# Patient Record
Sex: FEMALE | Race: BLACK OR AFRICAN AMERICAN | NOT HISPANIC OR LATINO | Employment: OTHER | URBAN - METROPOLITAN AREA
[De-identification: names, ages, dates, MRNs, and addresses within clinical notes are randomized per-mention and may not be internally consistent; named-entity substitution may affect disease eponyms.]

---

## 2023-04-07 ENCOUNTER — OFFICE VISIT (OUTPATIENT)
Dept: PRIMARY CARE | Facility: CLINIC | Age: 53
End: 2023-04-07
Payer: COMMERCIAL

## 2023-04-07 VITALS
TEMPERATURE: 97.6 F | DIASTOLIC BLOOD PRESSURE: 85 MMHG | HEIGHT: 63 IN | BODY MASS INDEX: 26.75 KG/M2 | WEIGHT: 151 LBS | OXYGEN SATURATION: 99 % | HEART RATE: 65 BPM | SYSTOLIC BLOOD PRESSURE: 145 MMHG

## 2023-04-07 DIAGNOSIS — R23.2 HOT FLASHES: Primary | ICD-10-CM

## 2023-04-07 DIAGNOSIS — G56.03 CARPAL TUNNEL SYNDROME ON BOTH SIDES: ICD-10-CM

## 2023-04-07 DIAGNOSIS — M17.0 PRIMARY OSTEOARTHRITIS OF BOTH KNEES: ICD-10-CM

## 2023-04-07 PROCEDURE — 99214 OFFICE O/P EST MOD 30 MIN: CPT | Performed by: STUDENT IN AN ORGANIZED HEALTH CARE EDUCATION/TRAINING PROGRAM

## 2023-04-07 RX ORDER — ACETAMINOPHEN 650 MG/1
TABLET, FILM COATED, EXTENDED RELEASE ORAL
COMMUNITY
End: 2023-04-07

## 2023-04-07 RX ORDER — NAPROXEN 500 MG/1
TABLET ORAL 2 TIMES DAILY
COMMUNITY
Start: 2020-02-11 | End: 2023-04-07

## 2023-04-07 RX ORDER — ACETAMINOPHEN 500 MG
TABLET ORAL
COMMUNITY
Start: 2022-12-07

## 2023-04-07 RX ORDER — DICLOFENAC SODIUM 10 MG/G
GEL TOPICAL
COMMUNITY
Start: 2023-03-22 | End: 2023-10-05 | Stop reason: SDUPTHER

## 2023-04-07 RX ORDER — ACETAMINOPHEN 325 MG/1
TABLET ORAL
COMMUNITY
Start: 2023-03-22 | End: 2023-04-07

## 2023-04-07 RX ORDER — ACETAMINOPHEN 650 MG/1
TABLET, FILM COATED, EXTENDED RELEASE ORAL
Qty: 90 TABLET | Refills: 1 | Status: SHIPPED | OUTPATIENT
Start: 2023-04-07

## 2023-04-07 RX ORDER — LAMOTRIGINE 100 MG/1
TABLET ORAL
COMMUNITY
Start: 2023-04-05 | End: 2023-10-02 | Stop reason: SDUPTHER

## 2023-04-07 RX ORDER — NAPROXEN 500 MG/1
500 TABLET ORAL 2 TIMES DAILY
COMMUNITY
Start: 2008-12-05 | End: 2023-04-07

## 2023-04-07 RX ORDER — LIDOCAINE 560 MG/1
PATCH PERCUTANEOUS; TOPICAL; TRANSDERMAL
COMMUNITY
Start: 2013-07-15

## 2023-04-07 ASSESSMENT — PAIN SCALES - GENERAL: PAINLEVEL: 8

## 2023-04-07 NOTE — PROGRESS NOTES
Subjective   Patient ID: Mela Sheffield is a 52 y.o. female with a PMHx of Chronic bilateral knee pain 2/2 severe OA, and depression who presents for Follow-up (Paper work, and hot flahes).  HPI    #Hot Flashes  - Started about 1-2 years, but has got really bad for the past 3-4 months suddenly. Symptoms are every day. Symptoms include sweating, feeling hot. Prior it was mild.   - Previously, she tried black cohosh with no relief.  - LMP was about 10 years ago. In the 40s.   - Hx of fibroids  - Denies any history blood clots, migraines, hx of breast cancer or endometrial cancer. But had benign masses/lumps on the L and R sides (removed at age 15 and 20, respectively).   - Was on OCP a long time ago and had no issues.   - BP at home is normal, SBPs in the 130s.     #Paper work  #Chronic bilateral knee pain 2/2 severe OA  #Bilateral carpal tunnel syndrome  - Medical source statement; Physical for her . Application for disability.   -unemployed currently  - Follows with orthopedics and neuromuscular medicine for care.       Review of Systems   All other systems reviewed and are negative.  Unless mentioned in HPI; 10 systems reviewed     Objective   Physical Exam  Vitals reviewed.   Constitutional:       General: She is not in acute distress.     Appearance: Normal appearance. She is not ill-appearing.   HENT:      Head: Normocephalic and atraumatic.      Right Ear: External ear normal.      Left Ear: External ear normal.      Nose: Nose normal.      Mouth/Throat:      Mouth: Mucous membranes are moist.   Eyes:      Extraocular Movements: Extraocular movements intact.      Conjunctiva/sclera: Conjunctivae normal.   Cardiovascular:      Rate and Rhythm: Normal rate and regular rhythm.      Pulses: Normal pulses.      Heart sounds: Normal heart sounds. No murmur heard.     No friction rub. No gallop.   Pulmonary:      Effort: Pulmonary effort is normal. No respiratory distress.      Breath sounds: Normal breath  sounds. No wheezing, rhonchi or rales.   Abdominal:      General: Bowel sounds are normal. There is no distension.      Palpations: Abdomen is soft. There is no mass.      Tenderness: There is no abdominal tenderness. There is no guarding or rebound.   Musculoskeletal:         General: No swelling, tenderness or deformity. Normal range of motion.      Cervical back: Normal range of motion and neck supple.      Right lower leg: No edema.      Left lower leg: No edema.      Comments: Limited ROM of the knees bilaterally 2/2 to pain/OA.    Skin:     General: Skin is warm and dry.      Capillary Refill: Capillary refill takes less than 2 seconds.   Neurological:      General: No focal deficit present.      Mental Status: She is alert and oriented to person, place, and time. Mental status is at baseline.   Psychiatric:         Mood and Affect: Mood normal.         Behavior: Behavior normal.         Thought Content: Thought content normal.         Judgment: Judgment normal.       There were no vitals taken for this visit.   No results found for: WBC, HGB, HCT, MCV, PLT   No results found for: GLUCOSE, CALCIUM, NA, K, CO2, CL, BUN, CREATININE     Assessment/Plan      Ms Mela Sheffield is a 51 yo F who came to the clinic presenting with worsening hot flashes for the past few months. Also requesting some paperwork to be completed. Overall clnically stable. Plan as below:     #Hot Flashes  - Worsening  - Started Angrliq 0.25-0.5mg every day; can uptitrate as appropriate  - C/w venlafaxine 75mg every day  - Discussed symptomatic non-pharmacological methods/treatments  - Return/ED precautions discussed     #OA of bilateral knees  #Bilateral carpal Tunnel Syndrome  - Requesting disability paperwork to be completed and sent to . Discussed with patient that she needs a functional evaluation by PT first before completion. Placed PT referral  - Follows with Orthopedics for care  - Follows with Neuromuscular medicine for care.    - Refilled tylenol    RTC in 1-2 months for follow up    Seen and discussed with Dr. David Steele DO   PGY-3 Family Medicine

## 2023-04-08 RX ORDER — VENLAFAXINE 75 MG/1
75 TABLET ORAL DAILY
COMMUNITY
End: 2023-10-02

## 2023-04-24 NOTE — PROGRESS NOTES
I saw and evaluated the patient. I personally obtained the key and critical portions of the history and physical exam or was physically present for key and critical portions performed by the resident/fellow. I reviewed the resident/fellow's documentation and discussed the patient with the resident/fellow. I agree with the resident/fellow's medical decision making as documented in the note.    Main Hernandez MD

## 2023-09-08 ENCOUNTER — PATIENT OUTREACH (OUTPATIENT)
Dept: CARE COORDINATION | Facility: CLINIC | Age: 53
End: 2023-09-08
Payer: COMMERCIAL

## 2023-10-02 ENCOUNTER — OFFICE VISIT (OUTPATIENT)
Dept: PRIMARY CARE | Facility: CLINIC | Age: 53
End: 2023-10-02
Payer: COMMERCIAL

## 2023-10-02 VITALS
DIASTOLIC BLOOD PRESSURE: 64 MMHG | HEIGHT: 63 IN | BODY MASS INDEX: 26.4 KG/M2 | TEMPERATURE: 97.1 F | HEART RATE: 60 BPM | OXYGEN SATURATION: 99 % | SYSTOLIC BLOOD PRESSURE: 104 MMHG | WEIGHT: 149 LBS

## 2023-10-02 DIAGNOSIS — F32.A DEPRESSION, UNSPECIFIED DEPRESSION TYPE: Primary | ICD-10-CM

## 2023-10-02 DIAGNOSIS — M17.0 PRIMARY OSTEOARTHRITIS OF BOTH KNEES: ICD-10-CM

## 2023-10-02 DIAGNOSIS — G56.00 CARPAL TUNNEL SYNDROME, UNSPECIFIED LATERALITY: ICD-10-CM

## 2023-10-02 PROBLEM — L72.3 SEBACEOUS CYST: Status: ACTIVE | Noted: 2023-07-18

## 2023-10-02 PROBLEM — M19.90 OSTEOARTHRITIS: Status: ACTIVE | Noted: 2023-10-02

## 2023-10-02 PROBLEM — D48.5 NEOPLASM OF UNCERTAIN BEHAVIOR OF SKIN: Status: ACTIVE | Noted: 2023-07-18

## 2023-10-02 PROBLEM — M17.11 PRIMARY OSTEOARTHRITIS OF RIGHT KNEE: Status: ACTIVE | Noted: 2023-10-02

## 2023-10-02 PROBLEM — N94.3 PREMENSTRUAL SYNDROME: Status: ACTIVE | Noted: 2023-10-02

## 2023-10-02 PROBLEM — E78.5 DYSLIPIDEMIA: Status: ACTIVE | Noted: 2023-10-02

## 2023-10-02 PROBLEM — D17.9 LIPOMA: Status: ACTIVE | Noted: 2023-10-02

## 2023-10-02 PROBLEM — R20.0 NUMBNESS OF LEFT HAND: Status: ACTIVE | Noted: 2023-10-02

## 2023-10-02 PROBLEM — L72.0 EPIDERMAL CYST: Status: ACTIVE | Noted: 2023-07-18

## 2023-10-02 PROCEDURE — 99213 OFFICE O/P EST LOW 20 MIN: CPT

## 2023-10-02 RX ORDER — VENLAFAXINE HYDROCHLORIDE 75 MG/1
CAPSULE, EXTENDED RELEASE ORAL
COMMUNITY
Start: 2022-09-15 | End: 2023-10-02

## 2023-10-02 RX ORDER — LAMOTRIGINE 100 MG/1
100 TABLET ORAL DAILY
Qty: 30 TABLET | Refills: 1 | Status: SHIPPED | OUTPATIENT
Start: 2023-10-02 | End: 2023-10-02

## 2023-10-02 RX ORDER — ACETAMINOPHEN, DIPHENHYDRAMINE HCL, PHENYLEPHRINE HCL 325; 25; 5 MG/1; MG/1; MG/1
TABLET ORAL
COMMUNITY
Start: 2023-05-03

## 2023-10-02 RX ORDER — VENLAFAXINE HYDROCHLORIDE 150 MG/1
150 CAPSULE, EXTENDED RELEASE ORAL DAILY
Qty: 30 CAPSULE | Refills: 1 | Status: SHIPPED | OUTPATIENT
Start: 2023-10-02 | End: 2023-10-02 | Stop reason: SDUPTHER

## 2023-10-02 RX ORDER — VENLAFAXINE HYDROCHLORIDE 150 MG/1
CAPSULE, EXTENDED RELEASE ORAL
COMMUNITY
Start: 2023-05-03 | End: 2023-10-02 | Stop reason: SDUPTHER

## 2023-10-02 RX ORDER — LAMOTRIGINE 25 MG/1
TABLET ORAL
COMMUNITY
Start: 2022-12-07 | End: 2023-10-02

## 2023-10-02 ASSESSMENT — PATIENT HEALTH QUESTIONNAIRE - PHQ9
SUM OF ALL RESPONSES TO PHQ QUESTIONS 1-9: 22
SUM OF ALL RESPONSES TO PHQ9 QUESTIONS 1 AND 2: 6
4. FEELING TIRED OR HAVING LITTLE ENERGY: NEARLY EVERY DAY
2. FEELING DOWN, DEPRESSED OR HOPELESS: NEARLY EVERY DAY
10. IF YOU CHECKED OFF ANY PROBLEMS, HOW DIFFICULT HAVE THESE PROBLEMS MADE IT FOR YOU TO DO YOUR WORK, TAKE CARE OF THINGS AT HOME, OR GET ALONG WITH OTHER PEOPLE: EXTREMELY DIFFICULT
3. TROUBLE FALLING OR STAYING ASLEEP OR SLEEPING TOO MUCH: MORE THAN HALF THE DAYS
7. TROUBLE CONCENTRATING ON THINGS, SUCH AS READING THE NEWSPAPER OR WATCHING TELEVISION: MORE THAN HALF THE DAYS
5. POOR APPETITE OR OVEREATING: MORE THAN HALF THE DAYS
6. FEELING BAD ABOUT YOURSELF - OR THAT YOU ARE A FAILURE OR HAVE LET YOURSELF OR YOUR FAMILY DOWN: NEARLY EVERY DAY
1. LITTLE INTEREST OR PLEASURE IN DOING THINGS: NEARLY EVERY DAY
8. MOVING OR SPEAKING SO SLOWLY THAT OTHER PEOPLE COULD HAVE NOTICED. OR THE OPPOSITE, BEING SO FIGETY OR RESTLESS THAT YOU HAVE BEEN MOVING AROUND A LOT MORE THAN USUAL: MORE THAN HALF THE DAYS
9. THOUGHTS THAT YOU WOULD BE BETTER OFF DEAD, OR OF HURTING YOURSELF: MORE THAN HALF THE DAYS

## 2023-10-02 ASSESSMENT — ANXIETY QUESTIONNAIRES
GAD7 TOTAL SCORE: 20
7. FEELING AFRAID AS IF SOMETHING AWFUL MIGHT HAPPEN: NEARLY EVERY DAY
1. FEELING NERVOUS, ANXIOUS, OR ON EDGE: MORE THAN HALF THE DAYS
5. BEING SO RESTLESS THAT IT IS HARD TO SIT STILL: NEARLY EVERY DAY
4. TROUBLE RELAXING: NEARLY EVERY DAY
3. WORRYING TOO MUCH ABOUT DIFFERENT THINGS: NEARLY EVERY DAY
6. BECOMING EASILY ANNOYED OR IRRITABLE: NEARLY EVERY DAY
IF YOU CHECKED OFF ANY PROBLEMS ON THIS QUESTIONNAIRE, HOW DIFFICULT HAVE THESE PROBLEMS MADE IT FOR YOU TO DO YOUR WORK, TAKE CARE OF THINGS AT HOME, OR GET ALONG WITH OTHER PEOPLE: EXTREMELY DIFFICULT
2. NOT BEING ABLE TO STOP OR CONTROL WORRYING: NEARLY EVERY DAY

## 2023-10-02 ASSESSMENT — COLUMBIA-SUICIDE SEVERITY RATING SCALE - C-SSRS
6. HAVE YOU EVER DONE ANYTHING, STARTED TO DO ANYTHING, OR PREPARED TO DO ANYTHING TO END YOUR LIFE?: NO
1. IN THE PAST MONTH, HAVE YOU WISHED YOU WERE DEAD OR WISHED YOU COULD GO TO SLEEP AND NOT WAKE UP?: YES
2. HAVE YOU ACTUALLY HAD ANY THOUGHTS OF KILLING YOURSELF?: NO

## 2023-10-02 ASSESSMENT — ENCOUNTER SYMPTOMS
OCCASIONAL FEELINGS OF UNSTEADINESS: 1
LOSS OF SENSATION IN FEET: 0
DEPRESSION: 1

## 2023-10-02 ASSESSMENT — PAIN SCALES - GENERAL: PAINLEVEL: 9

## 2023-10-02 NOTE — PROGRESS NOTES
Subjective   Patient ID: Mela is a 54 yo female w/PMHx of severe osteoarthritis in knees b/l, carpal tunnel b/l, and depression presenting for follow-up with severe depression and chronic pain in knees and hands.    # Depression  Pt was crying on arrival. She notes that she is extremely depressed, worsened by her pain. She feels suicidal and homicidal ideation on occasion (5 times in the last month) but currently has no thoughts or plans. She states that she feels “very depressed” and was seeing a psychiatrist online from [facility] but was not impressed by their services. Pt states that her therapist noted she may have some anger management issues as she is easily agitated by people. She was previously on Lamotrigine 100mg and Venlafaxine 150mg, which were very helpful her where she was “back to being leveled.” Pt history is notable for a suicidal attempt when she was much younger where she attempted with bleach ingestion. Today, she hopes to establish care with a therapist or provided community resources and restarted on her medications.    # Bilateral Hand Pain  Pt states that she has had 8/10 pain in her hands 2/2 carpal tunnel. She follows with neuromuscular medicine and orthopedics but her next appt is not for a while. She states that her hands periodically lock-up, causing her to drop items. Also noticed some numbness on the palmar side of her hand and fingers. She currently takes Tylenol but with minimal relief.    # Bilateral Knee Pain  Pt states that she has 8/10 pain in her knees bilaterally 2/2 severe OA but Tylenol is able to relieve her pain to a 6/10. She endorses some swelling, which is worse in the left knee than the right. Pt notes that the diclofenac cream has been extremely helpful in terms of pain management but once she puts the cream on she tries not to ambulate. She typically ambulates with a cane but arrived today with no cane. States that it is difficult for her to walk up and down  the stairs, especially that she lives on the 2nd floor of an apartment building with no elevator. She has fallen 3 times in the last month. Interested in [employer] because hard for her to ride the bus with the steps on the bus. She follows with orthopedics and is interested in knee surgery (starting on the left knee) but has not been able to as she lives on the 2nd floor of the apartment building with no elevator. Pt states her orthopedic surgeon noted that she must ensure no stairs after her surgery before the surgery can occur but pt says its difficult to move and find a place with no stairs. Her 23yo daughter is around to help but is not able to have the pt live with her.    PMH:  Past Medical History:   Diagnosis Date    Osteoarthritis of knee, unspecified     Osteoarthritis of knee    Other conditions influencing health status     Perimenopause    Pain in unspecified knee     Joint pain, knee      Medications:  None because ran out of medication    PSH:  Past Surgical History:   Procedure Laterality Date    BREAST LUMPECTOMY  07/15/2013    Breast Surgery Lumpectomy    KNEE SURGERY  06/26/2013    Knee Surgery Left    TUBAL LIGATION  06/26/2013    Tubal Ligation      Social Hx:  Living: alone; 23yo daughter lives nearby  PHQ2: positive    ROS:  General: POSITIVE for fatigue; NEGATIVE for fevers/chills, weight loss or gain  HEENT: NEGATIVE for visual loss, blurry vision or double vision, eye pain  Pulmonary: NEGATIVE for wheezing, dyspnea, chest pain with breathing, cough  Cardio: NEGATIVE for chest pain, palpitations, edema  Gl: NEGATIVE for abdominal pain, anorexia, change in bowel function, diarrhea or constipation  : NEGATIVE for difficulty urinating, dysuria, polyuria, nocturia  Musculoskeletal: POSITIVE for arthralgias, joint swelling/stiffness b/l but worse on left; NEGATIVE for myalgias, or limb pain/swelling   Neuro: POSITIVE for numbness in palmar hands and fingers b/l; NEGATIVE for headaches,  "paresthesia  Psychiatric: POSITIVE for insomnia, inexplicably sadness, anxiety, and anhedonia; NEGATIVE for memory problems    Patient Health Questionnaire-9 Score: 22  EVERETT 7 Score: 20    Objective   Vitals: /64 (BP Location: Left arm, Patient Position: Sitting, BP Cuff Size: Adult)   Pulse 60   Temp 36.2 °C (97.1 °F)   Ht 1.6 m (5' 3\")   Wt 67.6 kg (149 lb)   SpO2 99%   BMI 26.39 kg/m²      Physical Exam  General: awake, alert, depressed mood, appears stated age  HENT: NCAT  Eyes: no scleral icterus or conjunctival pallor  Cardiac: RRR, normal S1, S2, no M/R/G  Pulm: CTAB, normal respiratory effort, on room air  EXT: no peripheral edema, no asymmetry noted  MSK: no discoloration or focal joint swelling noted in joints b/l, difficulty with knee flexion and extension 2/2 excruciating pain, limited ROM b/l, antalgic gait  Psych: coherent thought process, depressed mood and affect    Assessment/Plan   52 yo female w/PMHx of severe osteoarthritis in knees b/l, carpal tunnel b/l, and depression presenting for follow-up with severe depression and chronic pain in knees and hands. Pt arrives to clinic in tears and is notably depressed. She was previously seen by a psychiatrist at [facility] for depression, but pt was not satisfied with the service. She was prescribed lamotrigine and venlafaxine, which significantly helped with her mood. Pt endorses occasional suicidal and homicidal ideation (5 times in the last month). PHQ-9 (22) and EVERETT-7 (20) suggest severe depression and anxiety. Recommend re-establishing care with psychiatrist/therapist and re-start lamotrigine and venlafaxine. Pt also endorses excruciating pain in hands and knees, 2/2 carpal tunnel and osteoarthritis, respectively. Tylenol is helping with knee pain but not hand pain. Also using diclofenac cream for knees, which has been helpful. Pt gait is antalic and has limited ROM of knee with minimal swelling or warmth. Recommend continue following " with orthopedic surgery to proceed with knee surgery and continue taking Tylenol and diclofenac cream. For hand pain, recommend nightly hand splint use and establish care with occupational therapy to reduce pain. Continue to follow with neuromuscular medicine.    # Depression  # Anxiety  - PHQ-9 = 22 (severe depression)  - EVERETT-7 = 20 (severe anxiety)  -restart lamotrigine and venlafaxine   -provide community mental health services  -establish care with psychiatrist or therapist    # Bilateral Carpal Tunnel    -continue follow with neuromuscular medicine and orthopedic surgery  -start using hand splint every night  -establish care with occupational therapy    # Bilateral Osteoarthritis of the Knee  -refill diclofenac cream  -continue with Tylenol   -continue follow with orthopedic surgery  -consult social work to assist with [employer] services    Patient Attending Supervision: discussed with attending physician (santo listed on this note).    Pt eloped against medical advice before final plan. Will call pt to discuss final plans. RTC in 1-2 months, or earlier as needed.    FRANCHESCA Melvin MS3    Patient seen and examined with the medical student noted above. Assessment and plan have been reviewed and I agree with current management.      Liang Witt, DO  Family Medicine PGY-2

## 2023-10-05 RX ORDER — DICLOFENAC SODIUM 10 MG/G
4 GEL TOPICAL 2 TIMES DAILY PRN
Qty: 50 G | Refills: 2 | Status: SHIPPED | OUTPATIENT
Start: 2023-10-05 | End: 2023-11-04

## 2023-10-05 RX ORDER — LAMOTRIGINE 100 MG/1
100 TABLET ORAL DAILY
Qty: 30 TABLET | Refills: 1 | Status: SHIPPED | OUTPATIENT
Start: 2023-10-05 | End: 2023-12-18 | Stop reason: WASHOUT

## 2023-10-05 RX ORDER — VENLAFAXINE HYDROCHLORIDE 150 MG/1
150 CAPSULE, EXTENDED RELEASE ORAL DAILY
Qty: 30 CAPSULE | Refills: 1 | Status: SHIPPED | OUTPATIENT
Start: 2023-10-05 | End: 2023-12-18 | Stop reason: SDUPTHER

## 2023-10-06 NOTE — PROGRESS NOTES
I reviewed the resident/fellow's documentation and discussed the patient with the resident/fellow. I agree with the resident/fellow's medical decision making as documented in the note.   Left before I personally see her and finalizing her plan of care.  Tracie Chandler MD

## 2023-12-18 ENCOUNTER — OFFICE VISIT (OUTPATIENT)
Dept: BEHAVIORAL HEALTH | Facility: CLINIC | Age: 53
End: 2023-12-18
Payer: COMMERCIAL

## 2023-12-18 DIAGNOSIS — F31.9 BIPOLAR 1 DISORDER (MULTI): ICD-10-CM

## 2023-12-18 DIAGNOSIS — F32.A DEPRESSION, UNSPECIFIED DEPRESSION TYPE: ICD-10-CM

## 2023-12-18 DIAGNOSIS — F41.1 GAD (GENERALIZED ANXIETY DISORDER): ICD-10-CM

## 2023-12-18 PROCEDURE — 99204 OFFICE O/P NEW MOD 45 MIN: CPT | Performed by: CLINICAL NURSE SPECIALIST

## 2023-12-18 RX ORDER — LAMOTRIGINE 150 MG/1
150 TABLET ORAL DAILY
Qty: 90 TABLET | Refills: 0 | Status: SHIPPED | OUTPATIENT
Start: 2023-12-18 | End: 2024-03-17

## 2023-12-18 RX ORDER — VENLAFAXINE HYDROCHLORIDE 150 MG/1
150 CAPSULE, EXTENDED RELEASE ORAL DAILY
Qty: 90 CAPSULE | Refills: 0 | Status: SHIPPED | OUTPATIENT
Start: 2023-12-18 | End: 2024-03-17
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
## 2025-06-30 ENCOUNTER — HOSPITAL ENCOUNTER (OUTPATIENT)
Dept: RADIOLOGY | Facility: CLINIC | Age: 55
Discharge: HOME | End: 2025-06-30
Payer: COMMERCIAL

## 2025-06-30 ENCOUNTER — HOSPITAL ENCOUNTER (OUTPATIENT)
Facility: HOSPITAL | Age: 55
Setting detail: OUTPATIENT SURGERY
End: 2025-06-30
Attending: ORTHOPAEDIC SURGERY | Admitting: ORTHOPAEDIC SURGERY
Payer: COMMERCIAL

## 2025-06-30 ENCOUNTER — OFFICE VISIT (OUTPATIENT)
Dept: ORTHOPEDIC SURGERY | Facility: CLINIC | Age: 55
End: 2025-06-30
Payer: COMMERCIAL

## 2025-06-30 VITALS — HEIGHT: 63 IN | BODY MASS INDEX: 26.58 KG/M2 | WEIGHT: 150 LBS

## 2025-06-30 DIAGNOSIS — M17.0 PRIMARY OSTEOARTHRITIS OF BOTH KNEES: Primary | ICD-10-CM

## 2025-06-30 DIAGNOSIS — M17.0 PRIMARY OSTEOARTHRITIS OF BOTH KNEES: ICD-10-CM

## 2025-06-30 PROCEDURE — 3008F BODY MASS INDEX DOCD: CPT | Performed by: ORTHOPAEDIC SURGERY

## 2025-06-30 PROCEDURE — 99204 OFFICE O/P NEW MOD 45 MIN: CPT | Performed by: ORTHOPAEDIC SURGERY

## 2025-06-30 PROCEDURE — 73562 X-RAY EXAM OF KNEE 3: CPT | Mod: 50

## 2025-06-30 PROCEDURE — 73562 X-RAY EXAM OF KNEE 3: CPT | Mod: BILATERAL PROCEDURE | Performed by: STUDENT IN AN ORGANIZED HEALTH CARE EDUCATION/TRAINING PROGRAM

## 2025-06-30 RX ORDER — PREGABALIN 25 MG/1
75 CAPSULE ORAL ONCE
OUTPATIENT
Start: 2025-06-30 | End: 2025-06-30

## 2025-06-30 RX ORDER — MELOXICAM 7.5 MG/1
7.5 TABLET ORAL ONCE
OUTPATIENT
Start: 2025-06-30 | End: 2025-06-30

## 2025-06-30 RX ORDER — CEFAZOLIN SODIUM 2 G/100ML
2 INJECTION, SOLUTION INTRAVENOUS ONCE
OUTPATIENT
Start: 2025-06-30 | End: 2025-06-30

## 2025-06-30 RX ORDER — ACETAMINOPHEN 325 MG/1
975 TABLET ORAL ONCE
OUTPATIENT
Start: 2025-06-30 | End: 2025-06-30

## 2025-06-30 NOTE — PROGRESS NOTES
55-year-old female here for bilateral knee pain.  History of a left ACL tear back in 2005.  She fell in a hole.  She had surgical repair at  by Dr. Piña.  Now has pain in both knees over the last few years.  Right knee is worse than left.  Has had both cortisone shots and gel shots in the past with minimal relief.  She is seen orthopedics in the past who discussed possible knee replacement.  She wanted know whether there are other options.    WD/WN thin female  A+O X3  No lymphedema  Inspection of both knees shows symmetric slight varus alignment.   Bilateral 5 degree flexion contracture.   Bilateral crepitus through range of motion.   5/5 strength in quads and hamstrings.   Stable varus/valgus stress.   (-) Lachman.   (-) Dave.   Sensation intact to LT.   Good pulses in both legs.   No effusion.   No erythema.    I personally reviewed the following radiographic exams: Weightbearing x-rays of both knees shows bone-on-bone lateral compartment arthrosis on right, medial compartment arthrosis on the left.  Bilateral severe patellofemoral arthrosis.    Assessment: Bilateral knee arthrosis.    Plan: Discussed nonoperative and operative options in detail.   Risk and benefits discussed in detail. All questions answered today.  Recovery timeline and expectations discussed in detail.  Do not have anything to offer other than knee replacement if she feels her shots did not give her any significant long-term relief in the past.  We discussed knee replacement.  We discussed return to activities.  We will do the right knee first.  Right total Knee PS  Replacement [id number] with Depuy Attune implant. Anesthesia: regional and consult.  Antibiotics Kefzol 2gm. 1 gm TXA 75 minutes   Crutches or Walker  POD 0   A1C 7.7, on insulin and orals  - had not been taking insulin the 2 weeks prior to admission  - GEORGI and premeals

## 2025-07-14 ENCOUNTER — OFFICE VISIT (OUTPATIENT)
Dept: ORTHOPEDIC SURGERY | Facility: CLINIC | Age: 55
End: 2025-07-14
Payer: COMMERCIAL

## 2025-07-14 DIAGNOSIS — G56.03 BILATERAL CARPAL TUNNEL SYNDROME: ICD-10-CM

## 2025-07-14 PROCEDURE — 99213 OFFICE O/P EST LOW 20 MIN: CPT | Performed by: ORTHOPAEDIC SURGERY

## 2025-07-14 PROCEDURE — 99213 OFFICE O/P EST LOW 20 MIN: CPT

## 2025-07-14 NOTE — PROGRESS NOTES
Patient presents with chronic progressive carpal tunnel syndrome of both hands left worse than right she is not diabetic she has tried nighttime splinting  Symptoms been progressive over the period of many months  She has had no treatment otherwise    Past medical,family and social histories have been reviewed and are up to date.  All other body systems have been reviewed and are negative for complaint.    Examination: Awake alert oriented appropriate she has bilateral thenar atrophy diminished sensibility in the median nerve distributions and positive Phalen's and Tinel's  Wrist mobility full      Pulses full    Impression bilateral carpal tunnel syndrome  Check EMG discussed possible surgical release

## 2025-07-24 ENCOUNTER — PHARMACY VISIT (OUTPATIENT)
Dept: PHARMACY | Facility: CLINIC | Age: 55
End: 2025-07-24
Payer: MEDICAID

## 2025-07-24 ENCOUNTER — TELEPHONE (OUTPATIENT)
Dept: PREADMISSION TESTING | Facility: HOSPITAL | Age: 55
End: 2025-07-24
Payer: COMMERCIAL

## 2025-07-24 ENCOUNTER — OFFICE VISIT (OUTPATIENT)
Facility: HOSPITAL | Age: 55
End: 2025-07-24
Payer: COMMERCIAL

## 2025-07-24 ENCOUNTER — EDUCATION (OUTPATIENT)
Dept: ORTHOPEDIC SURGERY | Facility: CLINIC | Age: 55
End: 2025-07-24
Payer: COMMERCIAL

## 2025-07-24 VITALS
BODY MASS INDEX: 26.54 KG/M2 | WEIGHT: 149.8 LBS | TEMPERATURE: 98.8 F | OXYGEN SATURATION: 98 % | DIASTOLIC BLOOD PRESSURE: 71 MMHG | RESPIRATION RATE: 18 BRPM | HEART RATE: 71 BPM | SYSTOLIC BLOOD PRESSURE: 104 MMHG | HEIGHT: 63 IN

## 2025-07-24 DIAGNOSIS — Z12.31 BREAST CANCER SCREENING BY MAMMOGRAM: ICD-10-CM

## 2025-07-24 DIAGNOSIS — H61.21 IMPACTED CERUMEN OF RIGHT EAR: ICD-10-CM

## 2025-07-24 DIAGNOSIS — Z12.11 COLON CANCER SCREENING: ICD-10-CM

## 2025-07-24 DIAGNOSIS — T16.1XXA ACUTE FOREIGN BODY OF RIGHT EAR CANAL, INITIAL ENCOUNTER: ICD-10-CM

## 2025-07-24 DIAGNOSIS — Z00.00 ROUTINE GENERAL MEDICAL EXAMINATION AT A HEALTH CARE FACILITY: Primary | ICD-10-CM

## 2025-07-24 PROCEDURE — 99396 PREV VISIT EST AGE 40-64: CPT | Performed by: STUDENT IN AN ORGANIZED HEALTH CARE EDUCATION/TRAINING PROGRAM

## 2025-07-24 PROCEDURE — 3008F BODY MASS INDEX DOCD: CPT | Performed by: STUDENT IN AN ORGANIZED HEALTH CARE EDUCATION/TRAINING PROGRAM

## 2025-07-24 PROCEDURE — RXMED WILLOW AMBULATORY MEDICATION CHARGE

## 2025-07-24 ASSESSMENT — PAIN SCALES - GENERAL: PAINLEVEL_OUTOF10: 0-NO PAIN

## 2025-07-24 NOTE — GROUP NOTE
In addition to the group class activities, Mela Sheffield had the following lab work completed:  No orders of the defined types were placed in this encounter.    Mela Sheffield  attended joint class on 7/24 and Did not bring a care partner to the class. The preop survey was completed and the patient provided attestation that they understand the content reviewed and that they do have a care partner identified to assist with recovery. Patient was provided with a folder of materials and instructed to call orthopedic navigator with questions.   A new History and Physical was not completed.    This class lasted approximately 2 hours and had 8 participants.   Thank you for attending our Joint Replacement class today in preparation for your upcoming surgery.  Topics discussed include:    MyChart Enrollment  Communication with Care Team  My Chart is the best form of communication to reach all of your caregivers  You can send messages to specific care givers, or a care team  Continued Education  You will be enrolled in a Total Joint Replacement care plan to receive additional education before and after surgery  You can review a short recording of the class content  Access to Medical Records  You can access test results, office notes, appointments, etc.  You can connect to other healthcare systems who use [employer] ([facility], [facility], [facility], etc.)  [email or web address]  Program Information  Using Meds to Beds is our standard process for joint replacements at [facility] to minimize issues with homegoing medications. Please let us know ahead of time if there is a reason why Meds to Beds cannot be used    Background/Understanding of Joint Replacement Surgery  Potential for same day discharge  Any questions or concerns about your specific surgery/plan are to be directed to the surgeon's office    How to Prepare for Surgery  Use of Nicotine Products/Smoking  Stop several weeks before surgery  Such products slow down the healing process and  increase risk of post-op infection and complications  Clearance for Surgery  Medical Clearance by Specialists  Dental Clearance  Cracked/Broken/Loose teeth left untreated may postpone surgery  The importance of post-op antibiotics for dental visits per surgeon protocol  Preadmission Testing  **Potential for postponed surgery if appropriate clearance is not obtained  Medication Instruction  Follow instructions provided by the doctor who prescribes your medication (typically, but not limited to cardiologist)  Preadmission testing will provide additional instructions during your appointment on what to stop and what to take as you get closer to surgery  For clarification of these instructions, please call preadmission testing directly - [phone]  Tips for Preparing the home for discharge from the hospital  Care Partner  Requirement for surgery, the patient must have a plan to have help at home  Potential for postponed surgery if plan for home support cannot be established  How the care partner can help after surgery  CHG Body Wash/Mouth Wash  Follow the instructions given at preadmission testing  Body wash is to be used on the body and hair for 5 washes  Mouthwash is to be used the night before and morning of surgery  **This is a system-wide protocol developed by infectious disease professionals, we will not alter our recommendations for those with sensitive skin or those who have special hair needs.  Please follow the instructions as they are written as this will provide the best infection prevention measures for surgery.  Should you have an allergy to one of the products, please discuss with your preadmission team**    What to Expect in the Hospital/At Home  Morning of Surgery NPO Guidelines  Nothing to eat after midnight  Water can be consumed up to 2 hours prior to arrival  Surgical and Post-Surgical Care Team  Surgical Team  Anesthesia Team  Nursing  Physical Therapy  Care [facility]  Arrival Instructions  Arrive at the time provided to you  Consider traffic patterns (rush-hour) based on arrival time  Have arrangements made for a ride home  If discharging same day, care partner should remain at the hospital  Recovering after Surgery  Recovery Room - Visitors are not brought back  Transition to hospital room - 2nd Floor, Visitors will be directed to your room  The presence of and strategies for controlling surgical pain and swelling  The importance of early mobility  Side effects after surgery  What to expect if staying overnight    Discharge Planning  The intended plan for discharge will be for patients to discharge home  All patients require a care partner (family, friend, neighbor, etc.) to stay with the patient for the first few nights after surgery  The inability to secure help at home will postpone surgery  Home Care Services set up per surgeon order  Physical Therapy  Occupational Therapy  **If desired, private duty care can be arranged by the patient ahead of time**  Outpatient Physical Therapy per surgeon order    Recovering at Home  Wound Care  Follow wound care instructions found in your discharge paperwork  Bandage is water-resistant and you may shower with the bandage  Do not scrub directly over the bandage  Do not submerge in water until cleared (bathtub, hot tub, pool, etc.)    Post-Op Risk Prevention  Infection Prevention  Promptly seek treatment for any infections post-operatively  Routine dental visits must be postponed for 3 months after surgery  Your surgeon may require antibiotics prior to future dental visits  Any concerns for infection not related directly to the knee or the hip should be managed by your primary care provider  Blood Clots  Be sure to complete the course of blood thinning medication as prescribed by your surgeon  Movement every 1-2 hours during the day is encouraged to prevent blood clots  Monitor for signs of blood clots  Wear compression stockings as  prescribed by your surgeon  Constipation  Constipation is common following surgery  Drink plenty of fluids  Take stool softener/laxative as prescribed by your surgeon  Move around frequently  Eat foods high in fiber  Fall Prevention  Prepare home ahead of time to clear space to move with walker  Remove throw rugs and electrical cords from walkways  Install railings near any stairways with more than 2 steps  Use night lights for increased visibility at night  Continue to use your assistive device until cleared by surgeon or physical therapy  Dislocation Prevention - Not all procedures will have dislocation precautions  Follow dislocation precautions provided by your surgeon  It is OK to resume sexual activity about 6 weeks following surgery  Be sure to follow any dislocation precautions assigned    Durable Medical Equipment  Cold Therapy  Breg Cold Therapy Machines  Ice/Gel Packs  Assistive Devices  Folding Walker with Wheels (in the front only)  No Rollators  Crutches if approved by Physical Therapy and Surgeon after surgery  Hip Kits  Raised Toilet Seats  Additional Compression Stockings    Joint Preservation  Healthy Activities when Cleared  Walking  Swimming  Bike Riding  Activities to Avoid  Refrain from repetitive motions which have a high impact on the joint  Gradual Progression  Progress activity slowly, listen to your body  Common Findings - NORMAL after surgery  Clicking/Grinding  Numbness near incision    Physical Therapy  Prehabilitation exercises  START TODAY ON BOTH LEGS  Surgery Specific Precautions  Follow surgery specific precautions found in your discharge paperwork    Follow-Up Visit  All patients will see their surgeon for a follow up visit after surgery  The visit may range from 2-6 weeks after surgery and is surgeon specific      Please don't hesitate to reach out if you have any additional questions or concerns.    Amandeep Holley BSN, RN  Inez Cardona BSN, RN-BC  Orthopedic Nurses  [place]  [facility]   [phone]

## 2025-07-24 NOTE — PROGRESS NOTES
"  Subjective   Patient ID: Mela Sheffield is a 55 y.o. female who presents for Leg Pain.     Interval changes:  8/4/25 right knee full replacement    1. Right ear fullness, pain  Occasional tinnitus of both ears, right is worse an more frequent  Uses Q-tips to clean ears  No jaw pain, nosebleeds, sinus pressure    A/  Right TM cannot be visualized.   There is impacted cerumen, along with wispy, white strands of what appears to be foreign material such as Q-tip in the center.  Left TM unremarkable.    P/  [ ] referral to ENT for eval/disimpaction  [ ] debrox    2. RHM  [ ] mammogram  [ ] cologuard         Review of Systems   All other systems reviewed and are negative.      Objective   /71 (BP Location: Left arm, Patient Position: Sitting, BP Cuff Size: Adult)   Pulse 71   Temp 37.1 °C (98.8 °F) (Temporal)   Resp 18   Ht 1.6 m (5' 3\")   Wt 67.9 kg (149 lb 12.8 oz)   SpO2 98%   BMI 26.54 kg/m²     Physical Exam  Vitals reviewed.   Constitutional:       Appearance: Normal appearance.   HENT:      Head: Normocephalic and atraumatic.      Mouth/Throat:      Mouth: Mucous membranes are moist.      Pharynx: Oropharynx is clear.     Eyes:      Extraocular Movements: Extraocular movements intact.      Conjunctiva/sclera: Conjunctivae normal.      Pupils: Pupils are equal, round, and reactive to light.       Cardiovascular:      Rate and Rhythm: Normal rate and regular rhythm.      Pulses: Normal pulses.      Heart sounds: Normal heart sounds.   Pulmonary:      Effort: Pulmonary effort is normal.      Breath sounds: Normal breath sounds.   Abdominal:      General: Abdomen is flat. Bowel sounds are normal.      Palpations: Abdomen is soft.     Musculoskeletal:         General: Normal range of motion.      Cervical back: Normal range of motion and neck supple.     Skin:     General: Skin is warm and dry.      Capillary Refill: Capillary refill takes less than 2 seconds.     Neurological:      General: No focal " deficit present.      Mental Status: She is alert.     Psychiatric:         Mood and Affect: Mood normal.         Behavior: Behavior normal.         Assessment/Plan   Problem List Items Addressed This Visit    None  Visit Diagnoses         Codes      Routine general medical examination at a health care facility    -  Primary Z00.00      Colon cancer screening     Z12.11    Relevant Orders    Cologuard® colon cancer screening      Breast cancer screening by mammogram     Z12.31    Relevant Orders    BI mammo bilateral screening tomosynthesis      Acute foreign body of right ear canal, initial encounter     T16.1XXA    Relevant Medications    carbamide peroxide (Debrox) 6.5 % otic solution    Other Relevant Orders    Referral to ENT      Impacted cerumen of right ear     H61.21    Relevant Medications    carbamide peroxide (Debrox) 6.5 % otic solution                 Note: This documentaion was entered into the electronic medical record using [employer] medical dictation software, and was not necessarily edited thereafter. Please excuse any errors of spelling or grammar.

## 2025-07-25 ENCOUNTER — PRE-ADMISSION TESTING (OUTPATIENT)
Dept: PREADMISSION TESTING | Facility: HOSPITAL | Age: 55
End: 2025-07-25
Payer: COMMERCIAL

## 2025-07-25 ENCOUNTER — APPOINTMENT (OUTPATIENT)
Dept: LAB | Facility: HOSPITAL | Age: 55
End: 2025-07-25
Payer: COMMERCIAL

## 2025-07-25 ENCOUNTER — HOSPITAL ENCOUNTER (OUTPATIENT)
Dept: RADIOLOGY | Facility: CLINIC | Age: 55
Discharge: HOME | End: 2025-07-25
Payer: COMMERCIAL

## 2025-07-25 VITALS
SYSTOLIC BLOOD PRESSURE: 112 MMHG | RESPIRATION RATE: 18 BRPM | BODY MASS INDEX: 26.17 KG/M2 | TEMPERATURE: 98.8 F | OXYGEN SATURATION: 97 % | HEIGHT: 63 IN | WEIGHT: 147.71 LBS | HEART RATE: 64 BPM | DIASTOLIC BLOOD PRESSURE: 71 MMHG

## 2025-07-25 DIAGNOSIS — M17.0 PRIMARY OSTEOARTHRITIS OF BOTH KNEES: ICD-10-CM

## 2025-07-25 DIAGNOSIS — E78.5 DYSLIPIDEMIA: Primary | ICD-10-CM

## 2025-07-25 DIAGNOSIS — Z01.818 PREOP TESTING: ICD-10-CM

## 2025-07-25 LAB
ANION GAP SERPL CALC-SCNC: 13 MMOL/L (ref 10–20)
BASOPHILS # BLD AUTO: 0.02 X10*3/UL (ref 0–0.1)
BASOPHILS NFR BLD AUTO: 0.3 %
BUN SERPL-MCNC: 12 MG/DL (ref 6–23)
CALCIUM SERPL-MCNC: 8.5 MG/DL (ref 8.6–10.3)
CHLORIDE SERPL-SCNC: 108 MMOL/L (ref 98–107)
CO2 SERPL-SCNC: 21 MMOL/L (ref 21–32)
CREAT SERPL-MCNC: 0.63 MG/DL (ref 0.5–1.05)
EGFRCR SERPLBLD CKD-EPI 2021: >90 ML/MIN/1.73M*2
EOSINOPHIL # BLD AUTO: 0.02 X10*3/UL (ref 0–0.7)
EOSINOPHIL NFR BLD AUTO: 0.3 %
ERYTHROCYTE [DISTWIDTH] IN BLOOD BY AUTOMATED COUNT: 12.6 % (ref 11.5–14.5)
EST. AVERAGE GLUCOSE BLD GHB EST-MCNC: 105 MG/DL
GLUCOSE SERPL-MCNC: 70 MG/DL (ref 74–99)
HBA1C MFR BLD: 5.3 % (ref ?–5.7)
HCT VFR BLD AUTO: 41.5 % (ref 36–46)
HGB BLD-MCNC: 14 G/DL (ref 12–16)
IMM GRANULOCYTES # BLD AUTO: 0.02 X10*3/UL (ref 0–0.7)
IMM GRANULOCYTES NFR BLD AUTO: 0.3 % (ref 0–0.9)
LYMPHOCYTES # BLD AUTO: 2.64 X10*3/UL (ref 1.2–4.8)
LYMPHOCYTES NFR BLD AUTO: 43.1 %
MCH RBC QN AUTO: 32.1 PG (ref 26–34)
MCHC RBC AUTO-ENTMCNC: 33.7 G/DL (ref 32–36)
MCV RBC AUTO: 95 FL (ref 80–100)
MONOCYTES # BLD AUTO: 0.6 X10*3/UL (ref 0.1–1)
MONOCYTES NFR BLD AUTO: 9.8 %
NEUTROPHILS # BLD AUTO: 2.82 X10*3/UL (ref 1.2–7.7)
NEUTROPHILS NFR BLD AUTO: 46.2 %
NRBC BLD-RTO: 0 /100 WBCS (ref 0–0)
PLATELET # BLD AUTO: 277 X10*3/UL (ref 150–450)
POTASSIUM SERPL-SCNC: 4.2 MMOL/L (ref 3.5–5.3)
RBC # BLD AUTO: 4.36 X10*6/UL (ref 4–5.2)
SODIUM SERPL-SCNC: 138 MMOL/L (ref 136–145)
WBC # BLD AUTO: 6.1 X10*3/UL (ref 4.4–11.3)

## 2025-07-25 PROCEDURE — 83036 HEMOGLOBIN GLYCOSYLATED A1C: CPT

## 2025-07-25 PROCEDURE — 73562 X-RAY EXAM OF KNEE 3: CPT | Mod: RT

## 2025-07-25 PROCEDURE — 93005 ELECTROCARDIOGRAM TRACING: CPT

## 2025-07-25 PROCEDURE — 85025 COMPLETE CBC W/AUTO DIFF WBC: CPT

## 2025-07-25 PROCEDURE — 80048 BASIC METABOLIC PNL TOTAL CA: CPT

## 2025-07-25 PROCEDURE — 99244 OFF/OP CNSLTJ NEW/EST MOD 40: CPT

## 2025-07-25 PROCEDURE — 87081 CULTURE SCREEN ONLY: CPT | Mod: AHULAB

## 2025-07-25 PROCEDURE — 93010 ELECTROCARDIOGRAM REPORT: CPT | Performed by: INTERNAL MEDICINE

## 2025-07-25 RX ORDER — CHLORHEXIDINE GLUCONATE ORAL RINSE 1.2 MG/ML
15 SOLUTION DENTAL AS NEEDED
Qty: 473 ML | Refills: 0 | Status: SHIPPED | OUTPATIENT
Start: 2025-07-25 | End: 2025-07-27

## 2025-07-25 ASSESSMENT — ENCOUNTER SYMPTOMS
EYES NEGATIVE: 1
CONSTITUTIONAL NEGATIVE: 1
GASTROINTESTINAL NEGATIVE: 1
ENDOCRINE NEGATIVE: 1
CARDIOVASCULAR NEGATIVE: 1
NECK NEGATIVE: 1
NEUROLOGICAL NEGATIVE: 1
RESPIRATORY NEGATIVE: 1
ARTHRALGIAS: 1

## 2025-07-25 NOTE — CPM/PAT H&P
[facility]/PAT Evaluation       Name: Mela Sheffield (Mela Sheffield)  /Age: 1970/55 y.o.     Visit Type:   In-Person       Chief Complaint: right knee pain    HPI      Date of Consult: 25    Referring Provider:  Dr. Arthur Patrick    Date, Surgery, and Length:  25, Right Knee Total Arthroplasty - Right, 170 minutes      Patient presents to [facility] for perioperative risk assessment prior to scheduled surgery. Pt with b/l knee pain, R>L. H/o left ACL tear with repair in . Pt has failed conservative mgmt with cortisone injections and gel shots with minimal relief.        This note was created in part upon personal review of patient's medical records.        Pt denies any past history of anesthetic complications such as PONV, awareness, prolonged sedation, dental damage, aspiration, cardiac arrest, difficult intubation, difficult I.V. access or unexpected hospital admissions. No history of malignant hyperthermia and or pseudocholinesterase deficiency.    No history of blood transfusions.    The patient IS NOT a Orthodoxy and will accept blood and blood products if medically indicated.     Type and screen WAS NOT sent.    Past Medical History:   Diagnosis Date    Osteoarthritis of knee, unspecified     Osteoarthritis of knee    Other conditions influencing health status     Perimenopause    Pain in unspecified knee     Joint pain, knee     Past Surgical History:   Procedure Laterality Date    BREAST LUMPECTOMY  07/15/2013    Breast Surgery Lumpectomy    KNEE SURGERY  2013    Knee Surgery Left    TUBAL LIGATION  2013    Tubal Ligation     No family history on file.    Social History     Tobacco Use   Smoking Status Some Days    Types: Cigarettes   Smokeless Tobacco Never     Social History     Substance and Sexual Activity   Alcohol Use Yes     Social History     Substance and Sexual Activity   Drug Use Never     Allergies   Allergen Reactions    Naproxen Diarrhea       Current Outpatient  "Medications   Medication Instructions    Arthritis Pain Relief, acetam, 650 mg ER tablet TAKE ONE TABLET EVERY 6 TO 8 HOURS IF NEEDED FOR PAIN (DO NOT EXCEED 5 TABLETS IN ANY 24 HOURS)    carbamide peroxide (Debrox) 6.5 % otic solution 5 drops, Right Ear, 2 times daily    chlorhexidine (Peridex) 0.12 % solution 15 mL, Mouth/Throat, As needed    drospirenone-estradioL (Angrliq) 0.25-0.5 mg tablet 1 tablet, oral, Daily    lamoTRIgine (LAMICTAL) 150 mg, oral, Daily    lidocaine 4 % patch APPLY 1 PATCH TO THE AFFECTED AREA AND LEAVE IN PLACE FOR 12 HOURS, THEN REMOVE AND LEAVE OFF FOR 12 HOURS.    venlafaxine XR (EFFEXOR-XR) 150 mg, oral, Daily, Do not crush or chew.          PAT ROS:   Constitutional:   neg    Neuro/Psych:   neg    Eyes:   neg    Ears:   neg    Nose:   neg    Mouth:   neg    Throat:   neg    Neck:   neg    Cardio:   neg    Respiratory:   neg    Endocrine:   neg    GI:   neg    :   neg    Musculoskeletal:    arthralgias  Hematologic:   neg    Skin:  neg        Physical Exam  Vitals reviewed.   Constitutional:       Appearance: Normal appearance.     Cardiovascular:      Rate and Rhythm: Normal rate and regular rhythm.      Heart sounds: No murmur heard.     No friction rub. No gallop.   Pulmonary:      Effort: Pulmonary effort is normal.      Breath sounds: No wheezing, rhonchi or rales.     Musculoskeletal:      Cervical back: Normal range of motion.      Comments: Limited ROM right knee, mild edema, no erythema     Neurological:      Mental Status: She is alert.          PAT AIRWAY:   Airway:     Mallampati::  I    Neck ROM::  Full  normal            Visit Vitals  /71   Pulse 64   Temp 37.1 °C (98.8 °F) (Temporal)   Resp 18   Ht 1.588 m (5' 2.5\")   Wt 67 kg (147 lb 11.3 oz)   SpO2 97%   BMI 26.59 kg/m²   Smoking Status Some Days   BSA 1.72 m²           Patient is a 55 y.o. AA female scheduled for Right Knee Total Arthroplasty - Right with Dr. Patrick on 8/4/25.      Plan      Neuro:  No " neurologic diagnosis, however, the patient is at increased risk for perioperative delirium secondary to  depression, type and duration of surgery, Patient instructed on and provided cognitive exercises  Patient is not at increased risk for perioperative CVA          Cardiovascular:  HLD- controlled with diet and lifestyle, pt is not on any medications for dyslipidemia.    RCRI: 0 Risk of Mace: 0.5%    Caprini:  7    Patient denies any chest pain, tightness, heaviness, pressure, radiating pain, palpitations, irregular heartbeats, lightheadedness, cough, congestion, shortness of breath, B[place], PND, near syncope, weight loss or gain.    Good functional capacity (7.01 METS)      EKG in PAT sinus bradycardia, possible left atrial enlargement, rate 59 bpm, QT/Qtc 418/413            Pulmonary:  No pulmonary diagnosis, however patient is at increased risk of perioperative complications secondary to  Tobacco abuse  Stop Bang score is 1 placing patient at low risk for CHELSEA  ARISCAT: <26 points, 1.6% risk of in-hospital postoperative pulmonary complication  PRODIGY: Low risk for opioid induced respiratory depression  Smoking cessation discussed with patient, patient also provided cessation education/hotline handout, [facility] toilet education discussed, patient also provided deep breathing exercises and incentive spirometry educational handout        Psych:  Depression- controlled with Lamotrigine and Venlafaxine (cont both through periop period).        Heme:  Patient instructed to ambulate as soon as possible postoperatively to decrease thromboembolic risk.    Initiate mechanical DVT prophylaxis as soon as possible and initiate chemical prophylaxis when deemed safe from a bleeding standpoint post surgery.              Risk assessment complete.  This patient is LOW risk candidate undergoing MODERATE risk procedure, patient is medically optimized for surgery.        Labs/testing obtained in PAT on 7/25/25: CBC, BMP, A1C, MRSA,  EKG    Lab Results   Component Value Date    WBC 6.1 07/25/2025    HGB 14.0 07/25/2025    HCT 41.5 07/25/2025    MCV 95 07/25/2025     07/25/2025     Lab Results   Component Value Date    GLUCOSE 70 (L) 07/25/2025    CALCIUM 8.5 (L) 07/25/2025     07/25/2025    K 4.2 07/25/2025    CO2 21 07/25/2025     (H) 07/25/2025    BUN 12 07/25/2025    CREATININE 0.63 07/25/2025       Follow up/communication: None      Preoperative medication instructions were provided and reviewed with the patient.  Any additional testing or evaluation was explained to the patient.  Nothing by mouth instructions were discussed and patient's questions were answered prior to conclusion to this encounter.  Patient verbalized understanding of preoperative instructions given in preadmission testing; discharge instructions available in EMR.    This note was dictated with speech recognition.  Minor errors may have been detected during use of speech recognition.

## 2025-07-25 NOTE — PREPROCEDURE INSTRUCTIONS
Medication List            Accurate as of July 25, 2025  2:02 PM. Always use your most recent med list.                Arthritis Pain Relief (acetam) 650 mg ER tablet  Generic drug: acetaminophen  TAKE ONE TABLET EVERY 6 TO 8 HOURS IF NEEDED FOR PAIN (DO NOT EXCEED 5 TABLETS IN ANY 24 HOURS)  Additional Medication Adjustments for Surgery: Other (Comment)  Notes to patient: Pt states she is not taking     chlorhexidine 0.12 % solution  Commonly known as: Peridex  Use 15 mL in the mouth or throat if needed for wound care (swish and spit 15 mL for 30 seconds night prior to surgery and morning of surgery) for up to 2 days.  Medication Adjustments for Surgery: Take/Use as prescribed     Debrox 6.5 % otic solution  Generic drug: carbamide peroxide  Administer 5 drops into the right ear 2 times a day for 4 days.  Medication Adjustments for Surgery: Take/Use as prescribed     drospirenone-estradioL 0.25-0.5 mg tablet  Commonly known as: Angrliq  Take 1 tablet by mouth once daily.  Additional Medication Adjustments for Surgery: Other (Comment)  Notes to patient: Pt states she is not taking     lamoTRIgine 150 mg tablet  Commonly known as: LaMICtal  Take 1 tablet (150 mg) by mouth once daily.  Medication Adjustments for Surgery: Take/Use as prescribed     lidocaine 4 % patch  Notes to patient: Pt states she is not taking     venlafaxine  mg 24 hr capsule  Commonly known as: Effexor-XR  Take 1 capsule (150 mg) by mouth once daily. Do not crush or chew.                     Preoperative Deep Breathing Exercises  Why it is important to do deep breathing exercises before my surgery?  Deep breathing exercises strengthen your breathing muscles.  This helps you to recover after your surgery and decreases the chance of breathing complications.  How are the deep breathing exercises done?  Sit straight with your back supported.  Breathe in deeply and slowly through your nose. Your lower rib cage should expand and your abdomen  may move forward.  Hold that breath for 3 to 5 seconds.  Breathe out through pursed lips, slowly and completely.  Rest and repeat 10 times every hour while awake.  Rest longer if you become dizzy or lightheaded.        CONTACT SURGEON'S OFFICE IF YOU DEVELOP:  * Fever = 100.4 F   * New respiratory symptoms (e.g. cough, shortness of breath, respiratory distress, sore throat)  * Recent loss of taste or smell  *Flu like symptoms such as headache, fatigue or gastrointestinal symptoms  * You develop any open sores, shingles, burning or painful urination   AND/OR:  * You no longer wish to have the surgery.  * Any other personal circumstances change that may lead to the need to cancel or defer this surgery.  *You were admitted to any hospital within one week of your planned procedure.    SMOKING:  *Quitting smoking can make a huge difference to your health and recovery from surgery.    *If you need help with quitting, call 1-900-QUIT-NOW.    THE DAY OF SURGERY:  *Do not eat any food after midnight the night before your surgery.   *YOU MUST drink 14 OUNCES of clear liquids TWO hours before your instructed ARRIVAL TIME to the hospital. This includes water, black tea/coffee (no milk or cream), apple juice, clear broth and electrolyte drinks (Gatorade).  Please avoid clear liquids that are red in color.   *You may chew gum/mints up to TWO hours before your surgery/procedure.    SURGICAL TIME:  *You will be contacted between 2 p.m. and 6 p.m. the business day before your surgery with your arrival time.  *If you haven't received a call by 6pm, call [phone].  *Scheduled surgery times may change and you will be notified if this occurs-check your personal voicemail for any updates.    ON THE MORNING OF SURGERY:  *Wear comfortable, loose fitting clothing.   *Do not use moisturizers, creams, lotions or perfume.  *All jewelry and valuables should be left at home.  *Prosthetic devices such as contact lenses, hearing aids,  dentures, eyelash extensions, hairpins and body piercing must be removed before surgery.    BRING WITH YOU:  *Photo ID and insurance card  *Current list of medications and allergies  *Pacemaker/Defibrillator/Heart stent cards  *CPAP machine and mask  *Slings/splints/crutches  *Copy of your complete Advanced Directive/DHPOA-if applicable  *Neurostimulator implant remote    PARKING AND ARRIVAL:  *Check in at the Main Entrance desk and let them know you are here for surgery.  *You will be directed to the 2nd floor surgical waiting area.    IF YOU ARE HAVING OUTPATIENT/SAME DAY SURGERY:  *A responsible adult MUST accompany you at the time of discharge and stay with you for 24 hours after your surgery.  *You may NOT drive yourself home after surgery.  *You may use a taxi or ride sharing service ([employer], Uber) to return home ONLY if you are accompanied by a friend or family member.  *Instructions for resuming your medications will be provided by your surgeon.     Incentive Spirometer   You were provided with an incentive spirometer in [facility]/PAT, please follow the below instructions.    What is an incentive spirometer?  An incentive spirometer is a device used before and after surgery to “exercise” your lungs.  It helps you to take deeper breaths to expand your lungs.  Below is an example of a basic incentive spirometer.  The device you receive may differ slightly but they all function the same.    Why do I need to use an incentive spirometer?  Using your incentive spirometer prepares your lungs for surgery and helps prevent lung problems after surgery.  How do I use my incentive spirometer?  When you're using your incentive spirometer, make sure to breathe through your mouth. If you breathe through your nose, the incentive spirometer won't work properly. You can hold your nose if you have trouble.  If you feel dizzy at any time, stop and rest. Try again at a later time.  Follow the steps below:  Set up your incentive  spirometer, expand the flexible tubing and connect to the outlet.  Sit upright in a chair or bed. Hold the incentive spirometer at eye level.   Put the mouthpiece in your mouth and close your lips tightly around it. Slowly breathe out (exhale) completely.  Breathe in (inhale) slowly through your mouth as deeply as you can. As you take a breath, you will see the piston rise inside the large column. While the piston rises, the indicator should move upwards. It should stay in between the 2 arrows (see Figure).  Try to get the piston as high as you can, while keeping the indicator between the arrows.   If the indicator doesn't stay between the arrows, you're breathing either too fast or too slow.  When you get it as high as you can, hold your breath for 10 seconds, or as long as possible. While you're holding your breath, the piston will slowly fall to the base of the spirometer.  Once the piston reaches the bottom of the spirometer, breathe out slowly through your mouth. Rest for a few seconds.  Repeat 10 times. Try to get the piston to the same level with each breath.  Repeat every hour while awake  You can carefully clean the outside of the mouthpiece with an alcohol wipe or soap and water.      Home Preoperative Antibacterial Shower     What is a home preoperative antibacterial shower?  This shower is a way of cleaning the skin with a germ killing soap before surgery.  The soap contains chlorhexidine, commonly known as CHG.  CHG is a soap for your skin with germ killing ability.  Let your doctor know if you are allergic to chlorhexidine.    Why do I need to take a preoperative antibacterial shower?  Skin is not sterile.  It is best to try to make your skin as free of germs as possible before surgery.  Proper cleansing with a germ killing soap before surgery can lower the number of germs on your skin.  This helps to reduce the risk of infection at the surgical site.  Following the instructions listed below will help  you prepare your skin for surgery.      How do I use the CHG skin cleanser?  Steps:  Begin using your CHG soap five days before your scheduled surgery on ________________________.    Days 1-4 Shower before bed:  Wash your face and genitals with your normal soap and rinse.           2.    Apply the CHG soap to a clean wet washcloth.  Turn the water off or move away                From the water spray to avoid premature rinsing of the CHG soap as you are applying.     3.   Lather your entire body from the neck down.  Do not use on your face or genitals.  4. Pay special attention to the area(s) where your incision(s) will be located unless they are on your face.  Avoid scrubbing your skin too hard.  The important point is to have the CHG soap sit on your skin for 3 minutes.    When the 3 minutes are up, turn on the water and rinse the CHG soap off your body completely.   Pat yourself dry with a clean, freshly-laundered towel.  Dress in clean, freshly laundered night clothes.    Be sure to change bed sheets and blankets at least on the first night of CHG body wash use. May change linens every night of the above protocol for maximum benefit.   Day 5:  Last shower is the morning of surgery: Follow above Instructions.    NOTE:        *Keep CHG soap out of eyes and ear canals   *DO NOT wash with regular soap on your body after you have used the CHG soap solution  *DO NOT apply powders, lotions, or perfume.  *Deodorant may be used days 1-4, BUT NOT the day of surgery    Who should I contact if I have any questions regarding the use of CHG soap?  Call the [facility], Preadmission Testing at [phone] if you have any questions.              Patient Information: Pre-Operative Infection Prevention Measures     Why did I have my nose, under my arms and groin swabbed?  The purpose of the swab is to identify Staphylococcus aureus inside your nose or on your skin.  The swab was sent to the  laboratory for culture.  A positive swab/culture for Staphylococcus aureus is called colonization or carriage.      What is Staphylococcus aureus?  Staphylococcus aureus, also known as “staph”, is a germ found on the skin or in the nose of healthy people.  Sometimes Staphylococcus aureus can get into the body and cause an infection.  This can be minor (such as pimples, boils or other skin problems).  It might also be serious (such as blood infection, pneumonia or a surgical site infection).    What is Staphylococcus aureus colonization or carriage?  Colonization or carriage means that a person has the germ but is not sick from it.  These bacteria can be spread on the hands or when breathing or sneezing.    How is Staphylococcus aureus spread?  It is most often spread by close contact with a person or item that carries it.    What happens if my culture is positive for Staphylococcus aureus?  Your doctor/medical team will use this information to guide any antibiotic treatment which may be necessary.  Regardless of the culture results, we will clean the inside of your nose with a betadine swab just before you have your surgery.      Will I get an infection if I have Staphylococcus aureus in my nose or on my skin?  Anyone can get an infection with Staphylococcus aureus.  However, the best way to reduce your risk of infection is to follow the instructions provided to you for the use of your CHG soap and dental rinse.        Who should I contact if I have any questions?  Call the [facility], Preadmission Testing at [phone] if you have any questions.          Patient Information: Oral/Dental Rinse  **This is a prescription; pick it up at your preferred local pharmacy **  What is oral/dental rinse?   It is a mouthwash. It is a way of cleaning the mouth with a germ killing solution before your surgery.  The solution contains chlorhexidine, commonly known as CHG.   It is used inside the  mouth to kill a bacteria known as Staphylococcus aureus.  Let your doctor know if you are allergic to Chlorhexidine.    Why do I need to use CHG oral/dental rinse?  The CHG oral/dental rinse helps to kill a bacteria in your mouth known a Staphylococcus aureus.     This reduces the risk of infection at the surgical site.      Using your CHG oral/dental rinse  STEPS:  Use your CHG oral/dental rinse after you brush your teeth the night before (at bedtime) and the morning of your surgery.  Follow all directions on your prescription label.    Use the cap on the container to measure 15ml (fill cap to fill line)  Swish (gargle if you can) the mouthwash in your mouth for at least 30 seconds, (do not to swallow) spit out  After you use your CHG rinse, do not rinse your mouth with water, drink or eat.  Please refer to prescription label for the appropriate time to resume oral intake  Dental rinse comes in one size bottle: 473ml ~16oz.  You will have leftover    rinse, discard after this use.    What side effects might I have using the CHG oral/dental rinse?  CHG rinse will stick to plaque on the teeth.  Brush and floss just before use.  Teeth brushing will help avoid staining of plaque during use.    Who should I contact if I have questions about the CHG oral/dental rinse?  Please call [facility], Preadmission Testing at [phone] if you have any questions

## 2025-07-25 NOTE — CPM/PAT NURSE NOTE
CPM/PAT Nurse Note      Name: Mela Sheffield (Mela Sheffield)  /Age: 1970/55 y.o.       Medical History[1]    Surgical History[2]    Patient  has no history on file for sexual activity.    Family History[3]    Allergies[4]    Prior to Admission medications   Medication Sig Start Date End Date Taking? Authorizing Provider   carbamide peroxide (Debrox) 6.5 % otic solution Administer 5 drops into the right ear 2 times a day for 4 days. 25 Yes Michael Preston MD   lamoTRIgine (LaMICtal) 150 mg tablet Take 1 tablet (150 mg) by mouth once daily. 23 Yes IZZY Beltran-CNS   venlafaxine XR (Effexor-XR) 150 mg 24 hr capsule Take 1 capsule (150 mg) by mouth once daily. Do not crush or chew. 23 Yes BERNARDA Beltran   Arthritis Pain Relief, acetam, 650 mg ER tablet TAKE ONE TABLET EVERY 6 TO 8 HOURS IF NEEDED FOR PAIN (DO NOT EXCEED 5 TABLETS IN ANY 24 HOURS)  Patient not taking: Reported on 2025   Austin Steele, DO   chlorhexidine (Peridex) 0.12 % solution Use 15 mL in the mouth or throat if needed for wound care (swish and spit 15 mL for 30 seconds night prior to surgery and morning of surgery) for up to 2 days. 25  IZZY Snow-CNP   drospirenone-estradioL (Angrliq) 0.25-0.5 mg tablet Take 1 tablet by mouth once daily.  Patient not taking: Reported on 2025   Austin Steele, DO   lidocaine 4 % patch APPLY 1 PATCH TO THE AFFECTED AREA AND LEAVE IN PLACE FOR 12 HOURS, THEN REMOVE AND LEAVE OFF FOR 12 HOURS.  Patient not taking: Reported on 2025 7/15/13   Historical Provider, MD   carbamide peroxide (Debrox) 6.5 % otic solution Administer 5 drops into the right ear 2 times a day for 4 days. 25  Michael Preston MD   melatonin 10 mg tablet  5/3/23 7/24/25  Historical Provider, MD   melatonin 5 mg tablet  22  Historical Provider, MD SHABANA ROMAN     DASI Risk Score    No data to display  Caprini DVT  Assessment    No data to display  Modified Frailty Index    No data to display  [id number] Stroke Risk Points  Current as of just now        N/A 0 to 9 Points:      Last Change: N/A          The [id number] risk score (Lip GH, et al. 2009. © 2010 [employer]) quantifies the risk of stroke for a patient with atrial fibrillation. For patients without atrial fibrillation or under the age of 18 this score appears as N/A. Higher score values generally indicate higher risk of stroke.        This score is not applicable to this patient. Components are not calculated.          Revised Cardiac Risk Index    No data to display  Apfel Simplified Score    No data to display  Risk Analysis Index Results This Encounter    No data found in the last 10 encounters.       Prodigy: High Risk  Total Score: 0          ARISCAT Score for Postoperative Pulmonary Complications    No data to display  Agrawal Perioperative Risk for Myocardial Infarction or Cardiac Arrest (MARVIN)    No data to display      Nurse Plan of Action:     After Visit Summary (AVS) reviewed and patient verbalized good understanding of medications and NPO instructions.  Pre-op infection prevention measures:  CHG showers and mouthwash reviewed, understanding voiced.  CHG soap given and patient verbalized need to pick CHG mouthwash at their preferred local pharmacy.              [1]   Past Medical History:  Diagnosis Date    Osteoarthritis of knee, unspecified     Osteoarthritis of knee    Other conditions influencing health status     Perimenopause    Pain in unspecified knee     Joint pain, knee   [2]   Past Surgical History:  Procedure Laterality Date    BREAST LUMPECTOMY  07/15/2013    Breast Surgery Lumpectomy    KNEE SURGERY  06/26/2013    Knee Surgery Left    TUBAL LIGATION  06/26/2013    Tubal Ligation   [3] No family history on file.  [4]   Allergies  Allergen Reactions    Naproxen Diarrhea

## 2025-07-27 LAB — STAPHYLOCOCCUS SPEC CULT: NORMAL

## 2025-07-28 ENCOUNTER — TELEPHONE (OUTPATIENT)
Dept: ORTHOPEDIC SURGERY | Facility: HOSPITAL | Age: 55
End: 2025-07-28
Payer: COMMERCIAL

## 2025-07-28 LAB
ATRIAL RATE: 59 BPM
P AXIS: 30 DEGREES
P OFFSET: 192 MS
P ONSET: 144 MS
PR INTERVAL: 156 MS
Q ONSET: 222 MS
QRS COUNT: 10 BEATS
QRS DURATION: 80 MS
QT INTERVAL: 418 MS
QTC CALCULATION(BAZETT): 413 MS
QTC FREDERICIA: 415 MS
R AXIS: 29 DEGREES
T AXIS: 39 DEGREES
T OFFSET: 431 MS
VENTRICULAR RATE: 59 BPM

## 2025-07-28 NOTE — TELEPHONE ENCOUNTER
Called patient to discuss upcoming surgery. Confirmed that the plan is for a same-day discharge. The patient has obtained their medical equipment. The patient does have a care partner to assist them at home postoperatively. They live in an apartment.  The patient does have stairs required for navigating the home. The patient currently does not use an assistive device. Reminded patient to follow PAT instructions leading up to surgery and to watch for a phone call from preop the day prior to their surgery to receive details about arrival time. All questions answered at this time.

## 2025-08-04 ENCOUNTER — ANESTHESIA (OUTPATIENT)
Dept: OPERATING ROOM | Facility: HOSPITAL | Age: 55
End: 2025-08-04
Payer: COMMERCIAL

## 2025-08-04 ENCOUNTER — ANESTHESIA EVENT (OUTPATIENT)
Dept: OPERATING ROOM | Facility: HOSPITAL | Age: 55
End: 2025-08-04
Payer: COMMERCIAL

## 2025-08-04 RX ORDER — SODIUM CHLORIDE, SODIUM LACTATE, POTASSIUM CHLORIDE, CALCIUM CHLORIDE 600; 310; 30; 20 MG/100ML; MG/100ML; MG/100ML; MG/100ML
100 INJECTION, SOLUTION INTRAVENOUS CONTINUOUS
OUTPATIENT
Start: 2025-08-04 | End: 2025-08-05

## 2025-08-04 RX ORDER — OXYCODONE HYDROCHLORIDE 5 MG/1
5 TABLET ORAL EVERY 4 HOURS PRN
Refills: 0 | OUTPATIENT
Start: 2025-08-04

## 2025-08-04 RX ORDER — LABETALOL HYDROCHLORIDE 5 MG/ML
5 INJECTION, SOLUTION INTRAVENOUS ONCE AS NEEDED
OUTPATIENT
Start: 2025-08-04

## 2025-08-04 RX ORDER — DIPHENHYDRAMINE HYDROCHLORIDE 50 MG/ML
12.5 INJECTION, SOLUTION INTRAMUSCULAR; INTRAVENOUS ONCE AS NEEDED
OUTPATIENT
Start: 2025-08-04

## 2025-08-04 RX ORDER — MIDAZOLAM HYDROCHLORIDE 2 MG/2ML
INJECTION, SOLUTION INTRAMUSCULAR; INTRAVENOUS CONTINUOUS PRN
Status: DISCONTINUED | OUTPATIENT
Start: 2025-08-04 | End: 2025-08-05 | Stop reason: HOSPADM

## 2025-08-04 RX ORDER — LIDOCAINE HYDROCHLORIDE 10 MG/ML
0.1 INJECTION, SOLUTION EPIDURAL; INFILTRATION; INTRACAUDAL; PERINEURAL ONCE
OUTPATIENT
Start: 2025-08-04 | End: 2025-08-04

## 2025-08-04 RX ORDER — FENTANYL CITRATE 50 UG/ML
INJECTION, SOLUTION INTRAMUSCULAR; INTRAVENOUS CONTINUOUS PRN
Status: DISCONTINUED | OUTPATIENT
Start: 2025-08-04 | End: 2025-08-05 | Stop reason: HOSPADM

## 2025-08-04 RX ORDER — PROPOFOL 10 MG/ML
INJECTION, EMULSION INTRAVENOUS CONTINUOUS PRN
Status: DISCONTINUED | OUTPATIENT
Start: 2025-08-04 | End: 2025-08-05 | Stop reason: HOSPADM

## 2025-08-04 RX ORDER — ONDANSETRON HYDROCHLORIDE 2 MG/ML
4 INJECTION, SOLUTION INTRAVENOUS ONCE AS NEEDED
OUTPATIENT
Start: 2025-08-04

## 2025-08-23 LAB — NONINV COLON CA DNA+OCC BLD SCRN STL QL: NEGATIVE

## (undated) DEVICE — BLADE, SAW, DUAL SAGITTAL, 1.9 X 90 X 30

## (undated) DEVICE — Device

## (undated) DEVICE — MIXER, CEMENT, MIXEVAC III HIGH VACUUM KIT, STERILE

## (undated) DEVICE — DRAPE, ISOLATION, ANTIMICROBIAL, W/POUCH, IOBAN 2, STERI DRAPE, 125 X 83 IN, DISPOSABLE, STERILE

## (undated) DEVICE — GLOVE, SURGICAL, PROTEXIS PI ORTHO, 8.0, PF, LF

## (undated) DEVICE — SUTURE, MONOCRYL, 4-0, 18 IN, PS2, UNDYED

## (undated) DEVICE — SUTURE, VICRYL PLUS, 2-0, 27IN, PSL, UND, BRAIDED

## (undated) DEVICE — SUTURE, VICRYL, 1, 27 IN, CT-1, VIOLET

## (undated) DEVICE — CUFF, TOURNIQUET, 30 X 4, DUAL PORT/SNGL BLADDER, DISP, LF

## (undated) DEVICE — GLOVE, SURGICAL, PROTEXIS PI W/NEU-THERA, 8.0, PF, LF

## (undated) DEVICE — BLADE, SAW, RECIPROCATING, DOUBLE-SIDED, 70 X 12.5 X 1 MM, STAINLESS STEEL

## (undated) DEVICE — HOOD, SURGICAL, FLYTE SURGICOOL